# Patient Record
Sex: FEMALE | Race: WHITE | NOT HISPANIC OR LATINO | Employment: FULL TIME | URBAN - METROPOLITAN AREA
[De-identification: names, ages, dates, MRNs, and addresses within clinical notes are randomized per-mention and may not be internally consistent; named-entity substitution may affect disease eponyms.]

---

## 2023-06-23 PROBLEM — F17.210 CIGARETTE NICOTINE DEPENDENCE: Status: ACTIVE | Noted: 2023-06-23

## 2023-06-23 PROBLEM — R31.29 MICROSCOPIC HEMATURIA: Status: ACTIVE | Noted: 2023-06-23

## 2023-06-23 PROBLEM — R82.90 ABNORMAL FINDING IN URINE: Status: RESOLVED | Noted: 2023-06-23 | Resolved: 2023-06-23

## 2023-06-23 PROBLEM — K64.9 HEMORRHOIDS: Status: ACTIVE | Noted: 2023-06-23

## 2023-06-23 PROBLEM — E55.9 VITAMIN D DEFICIENCY: Status: ACTIVE | Noted: 2023-06-23

## 2023-06-23 PROBLEM — E78.5 HYPERLIPIDEMIA: Status: ACTIVE | Noted: 2023-06-23

## 2023-06-23 PROBLEM — F41.9 ACUTE ANXIETY: Status: ACTIVE | Noted: 2023-06-23

## 2023-06-23 PROBLEM — I10 BENIGN ESSENTIAL HYPERTENSION: Status: ACTIVE | Noted: 2023-06-23

## 2023-06-23 PROBLEM — R92.8 ABNORMAL MAMMOGRAM: Status: ACTIVE | Noted: 2023-06-23

## 2023-06-23 RX ORDER — LISINOPRIL 10 MG/1
1 TABLET ORAL DAILY
COMMUNITY
Start: 2022-06-21 | End: 2023-07-02

## 2023-06-27 ENCOUNTER — OFFICE VISIT (OUTPATIENT)
Dept: PRIMARY CARE | Facility: CLINIC | Age: 58
End: 2023-06-27
Payer: COMMERCIAL

## 2023-06-27 VITALS
HEART RATE: 73 BPM | WEIGHT: 182 LBS | BODY MASS INDEX: 28.56 KG/M2 | HEIGHT: 67 IN | SYSTOLIC BLOOD PRESSURE: 155 MMHG | DIASTOLIC BLOOD PRESSURE: 108 MMHG

## 2023-06-27 DIAGNOSIS — Z12.31 BREAST CANCER SCREENING BY MAMMOGRAM: ICD-10-CM

## 2023-06-27 DIAGNOSIS — Z00.00 ROUTINE GENERAL MEDICAL EXAMINATION AT A HEALTH CARE FACILITY: Primary | ICD-10-CM

## 2023-06-27 DIAGNOSIS — E78.5 HYPERLIPIDEMIA, UNSPECIFIED HYPERLIPIDEMIA TYPE: ICD-10-CM

## 2023-06-27 DIAGNOSIS — Z12.4 CERVICAL CANCER SCREENING: ICD-10-CM

## 2023-06-27 DIAGNOSIS — E55.9 VITAMIN D DEFICIENCY: ICD-10-CM

## 2023-06-27 DIAGNOSIS — I10 BENIGN ESSENTIAL HYPERTENSION: ICD-10-CM

## 2023-06-27 DIAGNOSIS — Z12.11 COLON CANCER SCREENING: ICD-10-CM

## 2023-06-27 PROCEDURE — 84443 ASSAY THYROID STIM HORMONE: CPT

## 2023-06-27 PROCEDURE — 80053 COMPREHEN METABOLIC PANEL: CPT

## 2023-06-27 PROCEDURE — 3080F DIAST BP >= 90 MM HG: CPT | Performed by: FAMILY MEDICINE

## 2023-06-27 PROCEDURE — 87624 HPV HI-RISK TYP POOLED RSLT: CPT

## 2023-06-27 PROCEDURE — 82607 VITAMIN B-12: CPT

## 2023-06-27 PROCEDURE — 80061 LIPID PANEL: CPT

## 2023-06-27 PROCEDURE — 4004F PT TOBACCO SCREEN RCVD TLK: CPT | Performed by: FAMILY MEDICINE

## 2023-06-27 PROCEDURE — 3077F SYST BP >= 140 MM HG: CPT | Performed by: FAMILY MEDICINE

## 2023-06-27 PROCEDURE — 85027 COMPLETE CBC AUTOMATED: CPT

## 2023-06-27 PROCEDURE — 99396 PREV VISIT EST AGE 40-64: CPT | Performed by: FAMILY MEDICINE

## 2023-06-27 PROCEDURE — 82306 VITAMIN D 25 HYDROXY: CPT

## 2023-06-27 PROCEDURE — 88175 CYTOPATH C/V AUTO FLUID REDO: CPT

## 2023-06-27 RX ORDER — OLMESARTAN MEDOXOMIL 20 MG/1
20 TABLET ORAL DAILY
Qty: 30 TABLET | Refills: 5 | Status: SHIPPED | OUTPATIENT
Start: 2023-06-27 | End: 2024-03-19 | Stop reason: ALTCHOICE

## 2023-06-27 ASSESSMENT — PATIENT HEALTH QUESTIONNAIRE - PHQ9
2. FEELING DOWN, DEPRESSED OR HOPELESS: NOT AT ALL
1. LITTLE INTEREST OR PLEASURE IN DOING THINGS: NOT AT ALL
SUM OF ALL RESPONSES TO PHQ9 QUESTIONS 1 AND 2: 0

## 2023-06-28 LAB
ALANINE AMINOTRANSFERASE (SGPT) (U/L) IN SER/PLAS: 17 U/L (ref 7–45)
ALBUMIN (G/DL) IN SER/PLAS: 4.7 G/DL (ref 3.4–5)
ALKALINE PHOSPHATASE (U/L) IN SER/PLAS: 89 U/L (ref 33–136)
ANION GAP IN SER/PLAS: 13 MMOL/L (ref 10–20)
ASPARTATE AMINOTRANSFERASE (SGOT) (U/L) IN SER/PLAS: 19 U/L (ref 9–39)
BILIRUBIN TOTAL (MG/DL) IN SER/PLAS: 0.4 MG/DL (ref 0–1.2)
CALCIUM (MG/DL) IN SER/PLAS: 9.8 MG/DL (ref 8.6–10.6)
CARBON DIOXIDE, TOTAL (MMOL/L) IN SER/PLAS: 23 MMOL/L (ref 21–32)
CHLORIDE (MMOL/L) IN SER/PLAS: 108 MMOL/L (ref 98–107)
CHOLESTEROL (MG/DL) IN SER/PLAS: 228 MG/DL (ref 0–199)
CHOLESTEROL IN HDL (MG/DL) IN SER/PLAS: 69.3 MG/DL
CHOLESTEROL/HDL RATIO: 3.3
CREATININE (MG/DL) IN SER/PLAS: 0.7 MG/DL (ref 0.5–1.05)
ERYTHROCYTE DISTRIBUTION WIDTH (RATIO) BY AUTOMATED COUNT: 13.5 % (ref 11.5–14.5)
ERYTHROCYTE MEAN CORPUSCULAR HEMOGLOBIN CONCENTRATION (G/DL) BY AUTOMATED: 30.8 G/DL (ref 32–36)
ERYTHROCYTE MEAN CORPUSCULAR VOLUME (FL) BY AUTOMATED COUNT: 101 FL (ref 80–100)
ERYTHROCYTES (10*6/UL) IN BLOOD BY AUTOMATED COUNT: 4.79 X10E12/L (ref 4–5.2)
GFR FEMALE: >90 ML/MIN/1.73M2
GLUCOSE (MG/DL) IN SER/PLAS: 89 MG/DL (ref 74–99)
HEMATOCRIT (%) IN BLOOD BY AUTOMATED COUNT: 48.4 % (ref 36–46)
HEMOGLOBIN (G/DL) IN BLOOD: 14.9 G/DL (ref 12–16)
LDL: 128 MG/DL (ref 0–99)
LEUKOCYTES (10*3/UL) IN BLOOD BY AUTOMATED COUNT: 8.1 X10E9/L (ref 4.4–11.3)
NRBC (PER 100 WBCS) BY AUTOMATED COUNT: 0 /100 WBC (ref 0–0)
PLATELETS (10*3/UL) IN BLOOD AUTOMATED COUNT: 285 X10E9/L (ref 150–450)
POTASSIUM (MMOL/L) IN SER/PLAS: 4.4 MMOL/L (ref 3.5–5.3)
PROTEIN TOTAL: 7.5 G/DL (ref 6.4–8.2)
SODIUM (MMOL/L) IN SER/PLAS: 140 MMOL/L (ref 136–145)
TRIGLYCERIDE (MG/DL) IN SER/PLAS: 155 MG/DL (ref 0–149)
UREA NITROGEN (MG/DL) IN SER/PLAS: 21 MG/DL (ref 6–23)
VLDL: 31 MG/DL (ref 0–40)

## 2023-06-29 LAB
CALCIDIOL (25 OH VITAMIN D3) (NG/ML) IN SER/PLAS: 25 NG/ML
COBALAMIN (VITAMIN B12) (PG/ML) IN SER/PLAS: 445 PG/ML (ref 211–911)
THYROTROPIN (MIU/L) IN SER/PLAS BY DETECTION LIMIT <= 0.05 MIU/L: 1.75 MIU/L (ref 0.44–3.98)

## 2023-07-02 PROBLEM — Z12.4 CERVICAL CANCER SCREENING: Status: ACTIVE | Noted: 2023-07-02

## 2023-07-02 PROBLEM — Z12.31 BREAST CANCER SCREENING BY MAMMOGRAM: Status: ACTIVE | Noted: 2023-07-02

## 2023-07-02 PROBLEM — Z00.00 ROUTINE GENERAL MEDICAL EXAMINATION AT A HEALTH CARE FACILITY: Status: ACTIVE | Noted: 2023-07-02

## 2023-07-02 PROBLEM — Z12.11 COLON CANCER SCREENING: Status: ACTIVE | Noted: 2023-07-02

## 2023-07-02 NOTE — PROGRESS NOTES
"  Subjective     Patient ID: Tabby Villafuerte is a 58 y.o. female who presents for Annual Exam.      Last Physical : 3 Years ago     Pt's PMH, PSH, SH, FH , meds and allergies was obtained / reviewed and updated .     Dental visits : Y  Vision issues : N  Hearing issues : N    Immunizations : Y    Diet :  could be better  Exercise:  Weight concerns :     Alcohol: as noted in SH  Tobacco: as noted in SH  Recreational drug use : None/ as noted in SH    Sexually active : Active   Contraception :   Menstrual problems:  Premenopausal/perimenopausal/ postmenopausal:    G:  Parity:  Full term:    Premature:   (s):   Living :  Ab induced:   Ab spontaneous :  Ectopic :   Multiple :    PAP smear :  Mammogram :  Colonoscopy:    Metabolic screening   - Lipid   - Glucose    Blood pressure elevated was given lisinopril in the past had cough  ======================================================    Visit Vitals  Blood Pressure (Abnormal) 155/108   Pulse 73   Height 1.702 m (5' 7\")   Weight 82.6 kg (182 lb)   Body Mass Index 28.51 kg/m²   Smoking Status Former   Body Surface Area 1.98 m²      No LMP recorded.     =====================================    Review of systems:  Constitutional: no chills, no fever and no night sweats.     Eyes: no blurred vision and no eyesight problems.     ENT: no hearing loss, no nasal congestion, no nasal discharge, no hoarseness and no sore throat.     Cardiovascular: no chest pain, no intermittent leg claudication, no lower extremity edema, no palpitations and no syncope.     Respiratory: no cough, no shortness of breath during exertion, no shortness of breath at rest and no wheezing.     Gastrointestinal: no abdominal pain, no blood in stools, no constipation, no diarrhea, no melena, no nausea, no rectal pain and no vomiting.     Genitourinary: no dysuria, no change in urinary frequency, no urinary hesitancy, no feelings of urinary urgency and no vaginal discharge.     Musculoskeletal: no " arthralgias, no back pain and no myalgias.     Integumentary: no new skin lesions and no rashes.     Neurological: no difficulty walking, no headache, no limb weakness, no numbness and no tingling.     Psychiatric: no anxiety, no depression, no anhedonia and no substance use disorders.   ============================================================    Physical exam :    Constitutional: Alert and in no acute distress. Well developed, well nourished.     Eyes: Normal external exam. Pupils were equal in size, round, reactive to light (PERRL) with normal accommodation and extraocular movements intact (EOMI).     Ears, Nose, Mouth, and Throat: External inspection of ears and nose: Normal.  Otoscopic examination: Normal.      Neck: No neck mass was observed. Supple.     Cardiovascular: Heart rate and rhythm were normal, normal S1 and S2, no gallops, no murmurs and no pericardial rub    Pulmonary: No respiratory distress. Clear bilateral breath sounds.     Abdomen: Soft nontender; no abdominal mass palpated. No organomegaly.     Musculoskeletal: No joint swelling seen, normal movements of all extremities. Range of motion: Normal.  Muscle strength/tone: Normal.      Skin: Normal skin color and pigmentation, normal skin turgor, and no rash.     Neurologic: Deep tendon reflexes were 2+ and symmetric. Sensation: Normal.     Psychiatric: Judgment and insight: Intact. Mood and affect: Normal.    Lymphatic : Cervical/ axillary/ groin Lns Palpable/ non palpable     Endocrine: no recent weight gain and no recent weight loss.     Hematologic/Lymphatic: no tendency for easy bruising and no swollen glands.          All other systems have been reviewed and are negative for complaint.      Assessment/Plan    Problem List Items Addressed This Visit       Benign essential hypertension    Relevant Medications    olmesartan (BENIcar) 20 mg tablet    Other Relevant Orders    Vitamin B12 (Completed)    Hyperlipidemia    Relevant Orders     Vitamin D, Total (Completed)    Vitamin B12 (Completed)    Vitamin D deficiency    Relevant Orders    Vitamin D, Total (Completed)    Breast cancer screening by mammogram    Relevant Orders    BI mammo bilateral screening tomosynthesis    Lipid Panel (Completed)    Colon cancer screening    Relevant Orders    Fecal Occult Blood Immunoassay    Routine general medical examination at a health care facility - Primary    Relevant Orders    CBC (Completed)    Comprehensive Metabolic Panel (Completed)    Lipid Panel (Completed)    TSH with reflex to Free T4 if abnormal (Completed)    Cervical cancer screening    Relevant Orders    THINPREP PAP TEST   Pap done did not want colonoscopy fecal occult ordered mammogram ordered  We will start olmesartan follow-up in 6 to 8 weeks

## 2023-07-03 ENCOUNTER — TELEPHONE (OUTPATIENT)
Dept: PRIMARY CARE | Facility: CLINIC | Age: 58
End: 2023-07-03
Payer: COMMERCIAL

## 2023-07-07 LAB
COMPLETE PATHOLOGY REPORT: NORMAL
CONVERTED CLINICAL DIAGNOSIS-HISTORY: NORMAL
CONVERTED DIAGNOSIS COMMENT: NORMAL
CONVERTED FINAL DIAGNOSIS: NORMAL
CONVERTED FINAL REPORT PDF LINK TO COPY AND PASTE: NORMAL

## 2023-07-10 ENCOUNTER — TELEPHONE (OUTPATIENT)
Dept: PRIMARY CARE | Facility: CLINIC | Age: 58
End: 2023-07-10
Payer: COMMERCIAL

## 2023-07-10 NOTE — TELEPHONE ENCOUNTER
----- Message from Franco Wu MD sent at 7/10/2023 12:40 PM EDT -----  Please call the patient regarding her abnormal result  Age related changes. Normal PAP otherwise

## 2023-08-16 LAB — FECAL OCCULT BLD IMMUNOASSAY: NEGATIVE

## 2023-08-18 ENCOUNTER — TELEPHONE (OUTPATIENT)
Dept: PRIMARY CARE | Facility: CLINIC | Age: 58
End: 2023-08-18
Payer: COMMERCIAL

## 2023-11-02 ENCOUNTER — ANCILLARY PROCEDURE (OUTPATIENT)
Dept: RADIOLOGY | Facility: CLINIC | Age: 58
End: 2023-11-02
Payer: COMMERCIAL

## 2023-11-02 ENCOUNTER — APPOINTMENT (OUTPATIENT)
Dept: PRIMARY CARE | Facility: CLINIC | Age: 58
End: 2023-11-02
Payer: COMMERCIAL

## 2023-11-02 ENCOUNTER — OFFICE VISIT (OUTPATIENT)
Dept: PRIMARY CARE | Facility: CLINIC | Age: 58
End: 2023-11-02
Payer: COMMERCIAL

## 2023-11-02 VITALS
DIASTOLIC BLOOD PRESSURE: 131 MMHG | WEIGHT: 186.2 LBS | HEART RATE: 95 BPM | HEIGHT: 67 IN | BODY MASS INDEX: 29.22 KG/M2 | SYSTOLIC BLOOD PRESSURE: 161 MMHG

## 2023-11-02 DIAGNOSIS — M25.462 EFFUSION OF LEFT KNEE: Primary | ICD-10-CM

## 2023-11-02 DIAGNOSIS — M25.462 EFFUSION OF LEFT KNEE: ICD-10-CM

## 2023-11-02 PROCEDURE — 4004F PT TOBACCO SCREEN RCVD TLK: CPT | Performed by: FAMILY MEDICINE

## 2023-11-02 PROCEDURE — 3077F SYST BP >= 140 MM HG: CPT | Performed by: FAMILY MEDICINE

## 2023-11-02 PROCEDURE — 73562 X-RAY EXAM OF KNEE 3: CPT | Mod: LT

## 2023-11-02 PROCEDURE — 73562 X-RAY EXAM OF KNEE 3: CPT | Mod: LEFT SIDE | Performed by: RADIOLOGY

## 2023-11-02 PROCEDURE — 20610 DRAIN/INJ JOINT/BURSA W/O US: CPT | Performed by: FAMILY MEDICINE

## 2023-11-02 PROCEDURE — 3080F DIAST BP >= 90 MM HG: CPT | Performed by: FAMILY MEDICINE

## 2023-11-02 PROCEDURE — 99203 OFFICE O/P NEW LOW 30 MIN: CPT | Performed by: FAMILY MEDICINE

## 2023-11-02 RX ORDER — TRIAMCINOLONE ACETONIDE 40 MG/ML
80 INJECTION, SUSPENSION INTRA-ARTICULAR; INTRAMUSCULAR
Status: COMPLETED | OUTPATIENT
Start: 2023-11-02 | End: 2023-11-02

## 2023-11-02 RX ADMIN — TRIAMCINOLONE ACETONIDE 80 MG: 40 INJECTION, SUSPENSION INTRA-ARTICULAR; INTRAMUSCULAR at 12:35

## 2023-11-02 NOTE — PROGRESS NOTES
Knee pain    Chief Complaint:   No chief complaint on file.     History Of Present Illness:    Tabby Villafuerte is a 58 y.o. female  Patient presents today with left knee paint. It started about 3 weeks ago. She doesn't remember a specific instance where she injured it. It just became stiff and difficult to walk. The pain is most severe when putting weight on it, especially after sitting for long periods. She reports associated swelling, especially on the medial aspect. She tried ibuprofen and Tylenol for pain control which didn't help much. She also has been wearing a knee brace which hasn't helped with pain.     Patient also reports some stiffness in her right knee but not painful like her left.     She reports walking in the park about once/week for exercise.     ROS:  Denies fevers, chills, dizziness, chest pain, SOB, abdominal pain     PMH:  HTN  .       Review of Systems:    Negative  Constitutional: fever, chills, night sweats, unexpected weight change, changes in sleep  Eyes: loss of vision, double vision, floaters  Ear/Nose/Throat/Mouth: sore throat, hearing changes, sinus congestion  Cardiovascular: chest pain, chest heaviness, palpitations, swelling in ankles  Psychological: feelings of depression, feelings of anxiety.  Endocrine: excessive thirst, feeling too hot, feeling too cold, feeling tired, fatigue  Hematologic/Lymphatic: swollen glands, blood clotting problems, easy bruising.   Respiratory: shortness of breath, difficulty breathing, frequent cough, wheezing  Neurological: loss of consciousness, tremors, dizzy spells, numbness, tingling.  Gastrointestinal: abdominal pain, nausea, vomiting, indigestion, heartburn, sour taste in throat when waking up, constipation, diarrhea, bloody stools, loss of bowel control  Genitourinary: urinary incontinence, increased urinary frequency, painful urination, blood in urine  Skin: Rash, lumps or bumps, worrisome moles      Positive  Psychological: feeling generally  "happy, feeling safe at home      Last Recorded Vitals:  Vitals:    11/02/23 1131   BP: (!) 161/131   Pulse: 95   Weight: 84.5 kg (186 lb 3.2 oz)   Height: 1.702 m (5' 7\")       Past Medical History:  She has no past medical history on file.    Past Surgical History:  She has no past surgical history on file.      Social History:  She reports that she has quit smoking. Her smoking use included cigarettes. She has a 30.00 pack-year smoking history. She has never used smokeless tobacco. She reports current alcohol use. She reports that she does not use drugs.    Family History:  Family History   Problem Relation Name Age of Onset    Cancer Mother      Hypertension Other      Cancer Other          Allergies:  Patient has no known allergies.    Outpatient Medications:  Current Outpatient Medications   Medication Instructions    olmesartan (BENICAR) 20 mg, oral, Daily       Physical Exam:  GENERAL: Well developed, well nourished, alert and cooperative, and appears to be in no acute distress.  PSYCH: mood pleasant and appropriate  HEAD: normocephalic  EYES: PERRL, EOMI. vision is grossly intact.   NECK: Neck supple, non-tender. No masses, no thyromegaly. No anterior cervical lymphadenopathy.  CARDIAC: Normal S1 and S2.   LUNGS: Clear to auscultation without rales, rhonchi, wheezing.  GAIT: antalgic secondary to left knee pain; improved after injection  MUSCULOSKELETAL: left knee- large effusion, medial joint line ttp.  NEUROLOGICAL: CN II-XII grossly intact. Strength and sensation symmetric and intact throughout all 4 extremities.   SKIN: Skin normal color, texture and turgor with no lesions or eruptions.     Last Labs:  CBC -  Lab Results   Component Value Date    WBC 8.1 06/27/2023    HGB 14.9 06/27/2023    HCT 48.4 (H) 06/27/2023     (H) 06/27/2023     06/27/2023       CMP -  Lab Results   Component Value Date    CALCIUM 9.8 06/27/2023    PROT 7.5 06/27/2023    ALBUMIN 4.7 06/27/2023    AST 19 06/27/2023 " "   ALT 17 06/27/2023    ALKPHOS 89 06/27/2023    BILITOT 0.4 06/27/2023       LIPID PANEL -   Lab Results   Component Value Date    CHOL 228 (H) 06/27/2023    TRIG 155 (H) 06/27/2023    HDL 69.3 06/27/2023    CHHDL 3.3 06/27/2023    LDLF 128 (H) 06/27/2023    VLDL 31 06/27/2023     Joint Injection Large/Arthrocentesis: L knee on 11/2/2023 12:35 PM  Indications: pain and joint swelling  Details: 22 G needle, superolateral approach  Medications: 80 mg triamcinolone acetonide 40 mg/mL  Aspirate: 20 mL blood-tinged  Outcome: tolerated well, no immediate complications  Procedure, treatment alternatives, risks and benefits explained, specific risks discussed. Consent was given by the patient. Immediately prior to procedure a time out was called to verify the correct patient, procedure, equipment, support staff and site/side marked as required. Patient was prepped and draped in the usual sterile fashion.            No results found for: \"BNP\", \"HGBA1C\"    Assessment/Plan   Problem List Items Addressed This Visit             ICD-10-CM    Effusion of left knee - Primary M25.462    Relevant Orders    XR knee left 3 views     Left knee effusion, blood tinged aspirate without trauma, so agreed to check xray. Injected today- expectant management discussed. Phone touch base tomorrow AM to see how you doing.       Yoan Madera, DO  "

## 2023-11-03 ENCOUNTER — TELEPHONE (OUTPATIENT)
Dept: PRIMARY CARE | Facility: CLINIC | Age: 58
End: 2023-11-03

## 2023-11-03 ENCOUNTER — APPOINTMENT (OUTPATIENT)
Dept: PRIMARY CARE | Facility: CLINIC | Age: 58
End: 2023-11-03
Payer: COMMERCIAL

## 2023-11-03 NOTE — TELEPHONE ENCOUNTER
Pt states that her knee is feeling better from the injection.     Prelim xray min med compartment OA otherwise unremarkable.     Follow up as needed.

## 2024-01-29 ENCOUNTER — TELEPHONE (OUTPATIENT)
Dept: PRIMARY CARE | Facility: CLINIC | Age: 59
End: 2024-01-29
Payer: COMMERCIAL

## 2024-01-29 NOTE — TELEPHONE ENCOUNTER
Regarding early November 2023 left knee x-ray.  Medical record addendum noted potential air or critical finding.  Finding was electronically sent Saturday, January 27, 2024.  1/29/24: called patient to see how her knee is feeling and have her give the office a call back.  Message left.

## 2024-02-07 ENCOUNTER — APPOINTMENT (OUTPATIENT)
Dept: PRIMARY CARE | Facility: CLINIC | Age: 59
End: 2024-02-07
Payer: COMMERCIAL

## 2024-02-27 ENCOUNTER — OFFICE VISIT (OUTPATIENT)
Dept: PRIMARY CARE | Facility: CLINIC | Age: 59
End: 2024-02-27
Payer: COMMERCIAL

## 2024-02-27 VITALS
DIASTOLIC BLOOD PRESSURE: 122 MMHG | BODY MASS INDEX: 29.54 KG/M2 | SYSTOLIC BLOOD PRESSURE: 172 MMHG | HEIGHT: 67 IN | HEART RATE: 90 BPM | WEIGHT: 188.2 LBS

## 2024-02-27 DIAGNOSIS — M25.462 EFFUSION OF LEFT KNEE: Primary | ICD-10-CM

## 2024-02-27 PROCEDURE — 3077F SYST BP >= 140 MM HG: CPT | Performed by: FAMILY MEDICINE

## 2024-02-27 PROCEDURE — 3080F DIAST BP >= 90 MM HG: CPT | Performed by: FAMILY MEDICINE

## 2024-02-27 PROCEDURE — 99213 OFFICE O/P EST LOW 20 MIN: CPT | Performed by: FAMILY MEDICINE

## 2024-02-27 ASSESSMENT — ENCOUNTER SYMPTOMS
DEPRESSION: 0
LOSS OF SENSATION IN FEET: 0
OCCASIONAL FEELINGS OF UNSTEADINESS: 0

## 2024-02-27 NOTE — PROGRESS NOTES
Pt is here for knee fuv,no concerns       Knee pain    Chief Complaint:   No chief complaint on file.     History Of Present Illness:    Tabby Villafuerte is a 58 y.o. female      Nov 2, 2023 visit: Patient presents today with left knee paint. It started about 3 weeks ago. She doesn't remember a specific instance where she injured it. It just became stiff and difficult to walk. The pain is most severe when putting weight on it, especially after sitting for long periods. She reports associated swelling, especially on the medial aspect. She tried ibuprofen and Tylenol for pain control which didn't help much. She also has been wearing a knee brace which hasn't helped with pain.    Feb 27, 2024 update: left knee feeling better. Radiology overread was concerning for air in plain film. 2/10 pain. Able to walk.      Patient also reports some stiffness in her right knee but not painful like her left.     She reports walking in the park about once/week for exercise.     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   .       Review of Systems:    Negative  Constitutional: fever, chills, night sweats, unexpected weight change, changes in sleep  Eyes: loss of vision, double vision, floaters  Ear/Nose/Throat/Mouth: sore throat, hearing changes, sinus congestion  Cardiovascular: chest pain, chest heaviness, palpitations, swelling in ankles  Psychological: feelings of depression, feelings of anxiety.  Endocrine: excessive thirst, feeling too hot, feeling too cold, feeling tired, fatigue  Hematologic/Lymphatic: swollen glands, blood clotting problems, easy bruising.   Respiratory: shortness of breath, difficulty breathing, frequent cough, wheezing  Neurological: loss of consciousness, tremors, dizzy spells, numbness, tingling.  Gastrointestinal: abdominal pain, nausea, vomiting, indigestion, heartburn,  "sour taste in throat when waking up, constipation, diarrhea, bloody stools, loss of bowel control  Genitourinary: urinary incontinence, increased urinary frequency, painful urination, blood in urine  Skin: Rash, lumps or bumps, worrisome moles      Positive  Psychological: feeling generally happy, feeling safe at home      Last Recorded Vitals:  Vitals:    02/27/24 1545   BP: (!) 172/122   Pulse: 90   Weight: 85.4 kg (188 lb 3.2 oz)   Height: 1.702 m (5' 7\")       Past Medical History:  She has no past medical history on file.    Past Surgical History:  She has no past surgical history on file.      Social History:  She reports that she has quit smoking. Her smoking use included cigarettes. She has a 30.00 pack-year smoking history. She has never used smokeless tobacco. She reports current alcohol use. She reports that she does not use drugs.    Family History:  Family History   Problem Relation Name Age of Onset    Cancer Mother      Hypertension Other      Cancer Other          Allergies:  Patient has no known allergies.    Outpatient Medications:  Current Outpatient Medications   Medication Instructions    olmesartan (BENICAR) 20 mg, oral, Daily       Physical Exam:  GENERAL: Well developed, well nourished, alert and cooperative, and appears to be in no acute distress.  PSYCH: mood pleasant and appropriate  HEAD: normocephalic  EYES: PERRL, EOMI. vision is grossly intact.   NECK: Neck supple, non-tender. No masses, no thyromegaly. No anterior cervical lymphadenopathy.  CARDIAC: Normal S1 and S2.   LUNGS: Clear to auscultation without rales, rhonchi, wheezing.  GAIT: antalgic secondary to left knee pain; improved after injection  MUSCULOSKELETAL: left knee- small suprapatellar swelling, medial joint line ttp. No erythema, no calf tenderness left.   NEUROLOGICAL: CN II-XII grossly intact. Strength and sensation symmetric and intact throughout all 4 extremities.   SKIN: Skin normal color, texture and turgor with no " lesions or eruptions.     Last Labs:  CBC -  Lab Results   Component Value Date    WBC 8.1 06/27/2023    HGB 14.9 06/27/2023    HCT 48.4 (H) 06/27/2023     (H) 06/27/2023     06/27/2023       CMP -  Lab Results   Component Value Date    CALCIUM 9.8 06/27/2023    PROT 7.5 06/27/2023    ALBUMIN 4.7 06/27/2023    AST 19 06/27/2023    ALT 17 06/27/2023    ALKPHOS 89 06/27/2023    BILITOT 0.4 06/27/2023       LIPID PANEL -   Lab Results   Component Value Date    CHOL 228 (H) 06/27/2023    TRIG 155 (H) 06/27/2023    HDL 69.3 06/27/2023    CHHDL 3.3 06/27/2023    LDLF 128 (H) 06/27/2023    VLDL 31 06/27/2023 Nov 2023 left knee plain film: possible air/fatty changes seen on plain films.     1. Effusion of left knee           Glad to hear that your left knee is overall feeling a little better.  You do have a little supra patellar swelling today on exam, no erythema or signs of infection.    Due to the over read addendum of the plain film x-ray agreed on advanced imaging.  Left knee MRI ordered.    Activities as tolerated.    Follow-up with your primary care physician regarding your elevated blood pressure today in the office as well as your desire for smoking cessation.    Regarding your left knee, we will discuss the next steps after your MRI result.    Discussed potential insurance/expense of your left knee MRI.        Yoan Madera, DO

## 2024-03-16 ASSESSMENT — PROMIS GLOBAL HEALTH SCALE
RATE_AVERAGE_PAIN: 2
RATE_PHYSICAL_HEALTH: FAIR
RATE_GENERAL_HEALTH: FAIR
CARRYOUT_PHYSICAL_ACTIVITIES: MOSTLY
RATE_SOCIAL_SATISFACTION: GOOD
RATE_AVERAGE_FATIGUE: MILD
CARRYOUT_SOCIAL_ACTIVITIES: GOOD
RATE_MENTAL_HEALTH: GOOD
RATE_QUALITY_OF_LIFE: FAIR
EMOTIONAL_PROBLEMS: SOMETIMES

## 2024-03-19 ENCOUNTER — OFFICE VISIT (OUTPATIENT)
Dept: PRIMARY CARE | Facility: CLINIC | Age: 59
End: 2024-03-19
Payer: COMMERCIAL

## 2024-03-19 VITALS
DIASTOLIC BLOOD PRESSURE: 102 MMHG | WEIGHT: 186.2 LBS | HEIGHT: 67 IN | BODY MASS INDEX: 29.22 KG/M2 | SYSTOLIC BLOOD PRESSURE: 162 MMHG

## 2024-03-19 DIAGNOSIS — M25.462 EFFUSION OF LEFT KNEE: ICD-10-CM

## 2024-03-19 DIAGNOSIS — I10 BENIGN ESSENTIAL HYPERTENSION: Primary | ICD-10-CM

## 2024-03-19 DIAGNOSIS — F17.210 CIGARETTE NICOTINE DEPENDENCE WITHOUT COMPLICATION: ICD-10-CM

## 2024-03-19 PROCEDURE — 99214 OFFICE O/P EST MOD 30 MIN: CPT | Performed by: FAMILY MEDICINE

## 2024-03-19 PROCEDURE — 3077F SYST BP >= 140 MM HG: CPT | Performed by: FAMILY MEDICINE

## 2024-03-19 PROCEDURE — 3080F DIAST BP >= 90 MM HG: CPT | Performed by: FAMILY MEDICINE

## 2024-03-19 RX ORDER — VARENICLINE TARTRATE 1 MG/1
1 TABLET, FILM COATED ORAL 2 TIMES DAILY
Qty: 60 TABLET | Refills: 2 | Status: SHIPPED | OUTPATIENT
Start: 2024-03-19 | End: 2024-06-17

## 2024-03-19 RX ORDER — VARENICLINE TARTRATE 0.5 (11)-1
KIT ORAL
Qty: 53 EACH | Refills: 0 | Status: SHIPPED | OUTPATIENT
Start: 2024-03-19

## 2024-03-19 RX ORDER — AMLODIPINE BESYLATE 5 MG/1
5 TABLET ORAL DAILY
Qty: 90 TABLET | Refills: 1 | Status: SHIPPED | OUTPATIENT
Start: 2024-03-19 | End: 2024-09-15

## 2024-03-19 ASSESSMENT — ENCOUNTER SYMPTOMS
OCCASIONAL FEELINGS OF UNSTEADINESS: 0
DEPRESSION: 0
LOSS OF SENSATION IN FEET: 0

## 2024-03-19 ASSESSMENT — PATIENT HEALTH QUESTIONNAIRE - PHQ9
1. LITTLE INTEREST OR PLEASURE IN DOING THINGS: NOT AT ALL
2. FEELING DOWN, DEPRESSED OR HOPELESS: NOT AT ALL
SUM OF ALL RESPONSES TO PHQ9 QUESTIONS 1 AND 2: 0

## 2024-03-19 NOTE — PROGRESS NOTES
Chief Complaint/HPI:  Patient is here for follow-up has been seeing sports medicine for knee pain has to schedule MRI is about to lose insurance therefore does not want to schedule it yet continues to have knee pain  Blood pressure is elevated today has been elevated the last few times  Would like to start medication to quit smoking has been trying to to do it on her own  Cholesterol was elevated the last time has been trying to eat healthy    ROS otherwise negative aside from what was mentioned above in HPI.      Patient Active Problem List   Diagnosis    Abnormal mammogram    Acute anxiety    Benign essential hypertension    Cigarette nicotine dependence    Hemorrhoids    Hyperlipidemia    Microscopic hematuria    Vitamin D deficiency    Breast cancer screening by mammogram    Colon cancer screening    Routine general medical examination at a health care facility    Cervical cancer screening    Effusion of left knee     History reviewed. No pertinent past medical history.  History reviewed. No pertinent surgical history.  Family History   Problem Relation Name Age of Onset    Cancer Mother      Hypertension Other      Cancer Other       Social History     Tobacco Use    Smoking status: Former     Packs/day: 1.00     Years: 30.00     Additional pack years: 0.00     Total pack years: 30.00     Types: Cigarettes    Smokeless tobacco: Never   Substance Use Topics    Alcohol use: Yes    Drug use: Never         ALLERGIES  No Known Allergies      MEDICATIONS  Current Outpatient Medications   Medication Sig Dispense Refill    amLODIPine (Norvasc) 5 mg tablet Take 1 tablet (5 mg) by mouth once daily. 90 tablet 1    varenicline (Chantix Starting Month Box) 0.5 mg (11)- 1 mg (42) tablet Take per package instructions 53 each 0    varenicline (Chantix) 1 mg tablet Take 1 tablet (1 mg) by mouth 2 times a day. Take with full glass of water. 60 tablet 2     No current facility-administered medications for this visit.          PHYSICAL EXAM  Visit Vitals  Blood Pressure (Abnormal) 162/102     .FLOWAMB[11   .FLOWAMB[14   Body mass index is 29.16 kg/m².  Gen: Alert, NAD  HEENT:  PERRLA, EOMI, conjunctiva and sclera normal in appearance  Respiratory:  Lungs CTAB  Cardiovascular:  Heart RRR. No M/R/G  Neuro:  Gross motor and sensory intact  Skin:  No suspicious lesions present    ASSESSMENT/PLAN  Problem List Items Addressed This Visit       Benign essential hypertension - Primary    Relevant Medications    amLODIPine (Norvasc) 5 mg tablet    Cigarette nicotine dependence    Relevant Medications    varenicline (Chantix Starting Month Box) 0.5 mg (11)- 1 mg (42) tablet    varenicline (Chantix) 1 mg tablet    Effusion of left knee    Current Assessment & Plan     Will schedule MRI through independent diagnostic facility                Franco Wu MD

## 2024-03-22 ENCOUNTER — APPOINTMENT (OUTPATIENT)
Dept: RADIOLOGY | Facility: HOSPITAL | Age: 59
End: 2024-03-22
Payer: COMMERCIAL